# Patient Record
Sex: MALE | Race: WHITE | NOT HISPANIC OR LATINO | Employment: OTHER | URBAN - NONMETROPOLITAN AREA
[De-identification: names, ages, dates, MRNs, and addresses within clinical notes are randomized per-mention and may not be internally consistent; named-entity substitution may affect disease eponyms.]

---

## 2023-04-26 ENCOUNTER — OFFICE VISIT (OUTPATIENT)
Dept: PRIMARY CARE | Facility: CLINIC | Age: 73
End: 2023-04-26
Payer: MEDICARE

## 2023-04-26 VITALS
SYSTOLIC BLOOD PRESSURE: 115 MMHG | BODY MASS INDEX: 19.93 KG/M2 | DIASTOLIC BLOOD PRESSURE: 73 MMHG | HEART RATE: 98 BPM | OXYGEN SATURATION: 97 % | WEIGHT: 139.2 LBS | HEIGHT: 70 IN

## 2023-04-26 DIAGNOSIS — I27.20 MILD PULMONARY HYPERTENSION (MULTI): ICD-10-CM

## 2023-04-26 DIAGNOSIS — E46 PROTEIN-CALORIE MALNUTRITION, UNSPECIFIED SEVERITY (MULTI): ICD-10-CM

## 2023-04-26 DIAGNOSIS — J44.9 CHRONIC OBSTRUCTIVE PULMONARY DISEASE, UNSPECIFIED COPD TYPE (MULTI): ICD-10-CM

## 2023-04-26 DIAGNOSIS — F33.9 DEPRESSION, RECURRENT (CMS-HCC): ICD-10-CM

## 2023-04-26 DIAGNOSIS — R56.9 SEIZURE (MULTI): ICD-10-CM

## 2023-04-26 DIAGNOSIS — H61.21 IMPACTED CERUMEN OF RIGHT EAR: Primary | ICD-10-CM

## 2023-04-26 DIAGNOSIS — F10.20 ALCOHOLISM (MULTI): ICD-10-CM

## 2023-04-26 DIAGNOSIS — F41.8 DEPRESSION WITH ANXIETY: ICD-10-CM

## 2023-04-26 DIAGNOSIS — F10.10 ALCOHOL ABUSE: ICD-10-CM

## 2023-04-26 PROBLEM — F17.200 SMOKING: Status: ACTIVE | Noted: 2023-04-26

## 2023-04-26 PROBLEM — K21.9 GASTROESOPHAGEAL REFLUX: Status: ACTIVE | Noted: 2023-04-26

## 2023-04-26 PROBLEM — I34.0 MILD MITRAL REGURGITATION: Status: ACTIVE | Noted: 2023-04-26

## 2023-04-26 PROBLEM — E55.9 AVITAMINOSIS D: Status: ACTIVE | Noted: 2023-04-26

## 2023-04-26 PROBLEM — R45.86 MOOD SWINGS: Status: ACTIVE | Noted: 2023-04-26

## 2023-04-26 PROBLEM — M54.12 CERVICAL RADICULOPATHY: Status: ACTIVE | Noted: 2023-04-26

## 2023-04-26 PROBLEM — R53.1 WEAKNESS GENERALIZED: Status: ACTIVE | Noted: 2023-04-26

## 2023-04-26 PROBLEM — S22.000A THORACIC COMPRESSION FRACTURE (MULTI): Status: ACTIVE | Noted: 2023-04-26

## 2023-04-26 PROBLEM — R07.89 MUSCULOSKELETAL CHEST PAIN: Status: ACTIVE | Noted: 2023-04-26

## 2023-04-26 PROBLEM — G40.109: Status: ACTIVE | Noted: 2023-04-26

## 2023-04-26 PROBLEM — Z86.73 HISTORY OF STROKE: Status: ACTIVE | Noted: 2023-04-26

## 2023-04-26 PROBLEM — G40.909 SEIZURE, EPILEPTIC (MULTI): Status: ACTIVE | Noted: 2023-04-26

## 2023-04-26 PROBLEM — F12.90 MARIJUANA USE, CONTINUOUS: Status: ACTIVE | Noted: 2023-04-26

## 2023-04-26 PROBLEM — H91.93 HEARING LOSS, BILATERAL: Status: ACTIVE | Noted: 2023-04-26

## 2023-04-26 PROBLEM — M72.0 DUPUYTREN CONTRACTURE: Status: ACTIVE | Noted: 2023-04-26

## 2023-04-26 PROBLEM — R40.4 SPELL OF ALTERED CONSCIOUSNESS: Status: ACTIVE | Noted: 2023-04-26

## 2023-04-26 PROBLEM — E78.5 DYSLIPIDEMIA: Status: ACTIVE | Noted: 2023-04-26

## 2023-04-26 PROBLEM — R20.0 NUMBNESS AND TINGLING IN BOTH HANDS: Status: ACTIVE | Noted: 2023-04-26

## 2023-04-26 PROBLEM — N39.0 RECURRENT UTI (URINARY TRACT INFECTION): Status: RESOLVED | Noted: 2023-04-26 | Resolved: 2023-04-26

## 2023-04-26 PROBLEM — M79.18 MYOFASCIAL PAIN SYNDROME: Status: ACTIVE | Noted: 2023-04-26

## 2023-04-26 PROBLEM — M47.812 CERVICAL SPONDYLOSIS WITHOUT MYELOPATHY: Status: ACTIVE | Noted: 2023-04-26

## 2023-04-26 PROBLEM — I65.23 BILATERAL CAROTID ARTERY STENOSIS: Status: ACTIVE | Noted: 2023-04-26

## 2023-04-26 PROBLEM — Z90.49 HISTORY OF COLON RESECTION: Status: ACTIVE | Noted: 2023-04-26

## 2023-04-26 PROBLEM — E78.5 HYPERLIPEMIA: Status: ACTIVE | Noted: 2023-04-26

## 2023-04-26 PROBLEM — L30.9 DERMATITIS: Status: RESOLVED | Noted: 2023-04-26 | Resolved: 2023-04-26

## 2023-04-26 PROBLEM — F41.9 ANXIETY: Status: ACTIVE | Noted: 2023-04-26

## 2023-04-26 PROBLEM — I65.29 CAROTID ARTERY PLAQUE: Status: ACTIVE | Noted: 2023-04-26

## 2023-04-26 PROBLEM — N40.0 BPH (BENIGN PROSTATIC HYPERPLASIA): Status: ACTIVE | Noted: 2023-04-26

## 2023-04-26 PROBLEM — M54.9 BACK PAIN: Status: ACTIVE | Noted: 2023-04-26

## 2023-04-26 PROBLEM — R73.9 HYPERGLYCEMIA: Status: ACTIVE | Noted: 2023-04-26

## 2023-04-26 PROBLEM — I34.1 MITRAL VALVE PROLAPSE: Status: ACTIVE | Noted: 2023-04-26

## 2023-04-26 PROBLEM — M54.14 THORACIC RADICULITIS: Status: ACTIVE | Noted: 2023-04-26

## 2023-04-26 PROBLEM — R20.2 NUMBNESS AND TINGLING IN BOTH HANDS: Status: ACTIVE | Noted: 2023-04-26

## 2023-04-26 PROBLEM — R03.0 ELEVATED BLOOD PRESSURE READING: Status: ACTIVE | Noted: 2023-04-26

## 2023-04-26 PROBLEM — H90.3 BILATERAL SENSORINEURAL HEARING LOSS: Status: ACTIVE | Noted: 2023-04-26

## 2023-04-26 PROBLEM — R26.89 IMBALANCE: Status: ACTIVE | Noted: 2023-04-26

## 2023-04-26 PROBLEM — G31.84 MCI (MILD COGNITIVE IMPAIRMENT): Status: ACTIVE | Noted: 2023-04-26

## 2023-04-26 PROCEDURE — 99213 OFFICE O/P EST LOW 20 MIN: CPT | Performed by: INTERNAL MEDICINE

## 2023-04-26 PROCEDURE — 69209 REMOVE IMPACTED EAR WAX UNI: CPT | Performed by: INTERNAL MEDICINE

## 2023-04-26 RX ORDER — LEVETIRACETAM 1000 MG/1
1000 TABLET ORAL 2 TIMES DAILY
Qty: 180 TABLET | Refills: 0 | Status: CANCELLED | OUTPATIENT
Start: 2023-04-26 | End: 2023-07-25

## 2023-04-26 ASSESSMENT — PATIENT HEALTH QUESTIONNAIRE - PHQ9
2. FEELING DOWN, DEPRESSED OR HOPELESS: NEARLY EVERY DAY
5. POOR APPETITE OR OVEREATING: MORE THAN HALF THE DAYS
9. THOUGHTS THAT YOU WOULD BE BETTER OFF DEAD, OR OF HURTING YOURSELF: NEARLY EVERY DAY
SUM OF ALL RESPONSES TO PHQ9 QUESTIONS 1 AND 2: 6
8. MOVING OR SPEAKING SO SLOWLY THAT OTHER PEOPLE COULD HAVE NOTICED. OR THE OPPOSITE, BEING SO FIGETY OR RESTLESS THAT YOU HAVE BEEN MOVING AROUND A LOT MORE THAN USUAL: NEARLY EVERY DAY
4. FEELING TIRED OR HAVING LITTLE ENERGY: NEARLY EVERY DAY
SUM OF ALL RESPONSES TO PHQ QUESTIONS 1-9: 26
10. IF YOU CHECKED OFF ANY PROBLEMS, HOW DIFFICULT HAVE THESE PROBLEMS MADE IT FOR YOU TO DO YOUR WORK, TAKE CARE OF THINGS AT HOME, OR GET ALONG WITH OTHER PEOPLE: EXTREMELY DIFFICULT
6. FEELING BAD ABOUT YOURSELF - OR THAT YOU ARE A FAILURE OR HAVE LET YOURSELF OR YOUR FAMILY DOWN: NEARLY EVERY DAY
1. LITTLE INTEREST OR PLEASURE IN DOING THINGS: NEARLY EVERY DAY
7. TROUBLE CONCENTRATING ON THINGS, SUCH AS READING THE NEWSPAPER OR WATCHING TELEVISION: NEARLY EVERY DAY
3. TROUBLE FALLING OR STAYING ASLEEP OR SLEEPING TOO MUCH: NEARLY EVERY DAY

## 2023-04-26 NOTE — PROGRESS NOTES
"Subjective   Patient ID: Nicola Jackson is a 72 y.o. male who presents for Seizures (Thinks he is having seizures, wakes up on floor with no idea what happened.).    HPI     Review of Systems    Objective   /73   Pulse 98   Ht 1.778 m (5' 10\")   Wt 63.1 kg (139 lb 3.2 oz)   SpO2 97%   BMI 19.97 kg/m²     Physical Exam    Assessment/Plan        Patient ID: Nicola Jackson is a 72 y.o. male.    Ear Cerumen Removal    Date/Time: 4/26/2023 3:08 PM    Performed by: Yoli Waller LPN  Authorized by: Meredith Solrozano MD    Consent:     Consent obtained:  Verbal    Consent given by:  Patient    Risks, benefits, and alternatives were discussed: yes    Universal protocol:     Patient identity confirmed:  Verbally with patient  Procedure details:     Location:  R ear    Procedure type: irrigation      Procedure outcomes: cerumen removed    Post-procedure details:     Hearing quality:  Improved    Procedure completion:  Tolerated    "

## 2023-04-26 NOTE — PROGRESS NOTES
"Subjective  Pt. States that he has had two seizures in the past two weeks, felt he had one last night. He states he has not taken any medication for seizures in the past two years. He states that he had not had seizures for past 8-9 months. He states that he has been drinking 8-10 beers per night. He states he has been moving his bowels ok. He states he has been urinating ok, but it is concentrated. He states he lives alone. He states he is very depressed as his wife recently  and he has no one. He states that his friends are helping him, was found on couch having seizure recently and they called to make him appointment. He states he woke up this morning soaked with urine \"clear up to his chest\".    ROBERT Jackson is a 72 y.o. male with PMH remarkable for left temporal lobe epilepsy who presents today to request neurology referrral.     Review of Systems  Constitutional: No fever, chills, anorexia or weight loss  Eye: No blurry vision, diplopia or vision loss  ENT: No nasal congestion, earache or sore throat  Respiratory: No cough, SOB, hemoptysis or wheezing  Cardiac: No chest pain or palpitations, dyspnea on exertion or orthopnea  Gastrointestinal: No abdominal pain, nausea, vomiting, diarrhea, melena, hemoptysis or hematochezia.  Genitourinary: No dysuria, hematuria, frequency, urgency, incontinence or flank pain  Musculoskeletal: No arthralgia, myalgia, stiffness, weakness  Neurological: No dizziness, lightheadness, blurry vision, headache, + syncope  Psychiatric: + history of anxiety, depression, No hallucinations, suicidal/homicidal ideation  Skin: No rash or pruritus, ulcers  Endocrine: No heat or cold intolerance, polyuria, polydipsia  Hematologic: No bruising, petechiae    Objective   Vitals:    23 1428   BP: 115/73   Pulse: 98   SpO2: 97%   Weight: 63.1 kg (139 lb 3.2 oz)   Height: 1.778 m (5' 10\")     Physical Exam  General: Alert and oriented x3, underweight, very anxious and argumentative, " "extremely Teller  Eye: PERRL, EOMI, normal conjunctiva.  HENT: Normocephalic, clear tympanic membranes, moist oral mucosa, no sinus tenderness.  Neck: Supple, non-tender, no carotid bruits, no JVD, no lymphadenopathy.  Lungs: diffuse rhonchi, non-labored respiration.  Heart: Normal rate, regular rhythm, no murmur, gallop or edema.  Abdomen: Soft, non-tender, non-distended, normal bowel sounds, no masses.  Musculoskeletal: Normal range of motion and strength, no tenderness or swelling.  Skin: Skin is warm, dry and pink, no rashes or lesions.  Neurologic: Alert & Oriented x3, intact senses, motor, response and reflexes ok, normal strength, CN II-XII intact.  Psychiatric: Anxious, argumentative    Assessment/Plan   Nicola Jackson is a 72 y.o. male with PMH remarkable for left temporal lobe epilepsy   - reviewed neurology note from  from April 2022, in which it is stated that pt should not be driving until he follows up with epilepsy specialist and pt. Refuses medication for seizures.  - he states he has had no seizures for past 8-9 months, but he has had 2 seizures over past two weeks  - discussed options with patient, recommended hospitalization but patient is unwilling to be hospitalized  - he states he has too many valuables at this time to leave behind to be hospitalized  - he states he has no one to help him  - he is very argumentative, wants to put things off for now  - he is severely depressed since his wife passed away  - pt. With significant anger on exam, he is determined and adamant that he does not want to be hospitalized, and he wants to continue to drive. He states he does not care if he or someone else dies if he has a seizure while driving.   - discussed with patient with 2 nurses present, tried to explain to him his current issues including severe depression, recent seizure, excessive alcohol intake and he started to threaten, stating \"if you take my 's license away, you will see me again\" " I continued to try to talk to patient and explain to him that he needs help and I am willing to help if he will go to hospital and refrain from driving. We will start on Keppra and refer him to neurology. Called to discuss with patient's friend Jenn who's number he provided and who sent patient to us, she did not answer her phone, left long message for her.  - pt. Has possible new seizure activity, patient is very depressed, is alcohol and has COPD at least. Unfortunately, he is not cooperating. Sent in 30 day supply of Keppra- he is agreeable to take. I can watch him for next 30 days, with any follow up or recommendation in that [place] [place] [facility], but will terminate relation with patient after 30 days due to above events and noncompliance.  ------------------------  Written by Chelsey Angelo acting as a scribe for Dr. Butterfield. This note accurately reflects the work and decisions made by Dr. Butterfield.     I, Dr. Butterfield, attest all medical record entries made by the scribe were under my direction and were personally dictated by me. I have reviewed the chart and agree that the record accurately reflects my performance of the history, physical exam, and assessment and plan.

## 2023-04-27 RX ORDER — LEVETIRACETAM 1000 MG/1
1000 TABLET ORAL 2 TIMES DAILY
Qty: 60 TABLET | Refills: 0 | Status: SHIPPED | OUTPATIENT
Start: 2023-04-27 | End: 2024-02-16 | Stop reason: WASHOUT

## 2023-04-28 PROBLEM — E46 PROTEIN-CALORIE MALNUTRITION, UNSPECIFIED SEVERITY (MULTI): Status: ACTIVE | Noted: 2023-04-28

## 2023-04-28 PROBLEM — F33.9 DEPRESSION, RECURRENT (CMS-HCC): Status: ACTIVE | Noted: 2023-04-28

## 2023-04-28 NOTE — PATIENT INSTRUCTIONS

## 2023-11-25 ENCOUNTER — APPOINTMENT (OUTPATIENT)
Dept: RADIOLOGY | Facility: HOSPITAL | Age: 73
End: 2023-11-25
Payer: MEDICARE

## 2023-11-25 ENCOUNTER — HOSPITAL ENCOUNTER (EMERGENCY)
Facility: HOSPITAL | Age: 73
Discharge: HOME | End: 2023-11-25
Attending: STUDENT IN AN ORGANIZED HEALTH CARE EDUCATION/TRAINING PROGRAM
Payer: MEDICARE

## 2023-11-25 VITALS
OXYGEN SATURATION: 99 % | HEART RATE: 87 BPM | HEIGHT: 71 IN | DIASTOLIC BLOOD PRESSURE: 78 MMHG | SYSTOLIC BLOOD PRESSURE: 115 MMHG | RESPIRATION RATE: 15 BRPM | WEIGHT: 136.24 LBS | BODY MASS INDEX: 19.07 KG/M2 | TEMPERATURE: 98.2 F

## 2023-11-25 DIAGNOSIS — R07.81 RIB PAIN: ICD-10-CM

## 2023-11-25 DIAGNOSIS — R26.81 UNSTEADY GAIT: ICD-10-CM

## 2023-11-25 DIAGNOSIS — S09.90XA INJURY OF HEAD, INITIAL ENCOUNTER: ICD-10-CM
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
## 2023-11-25 LAB
ALBUMIN SERPL-MCNC: 5 G/DL (ref 3.5–5)
ALP BLD-CCNC: 154 U/L (ref 35–125)
ALT SERPL-CCNC: 19 U/L (ref 5–40)
ANION GAP SERPL CALC-SCNC: 10 MMOL/L
AST SERPL-CCNC: 26 U/L (ref 5–40)
BASOPHILS # BLD AUTO: 0.04 X10*3/UL (ref 0–0.1)
BASOPHILS NFR BLD AUTO: 0.6 %
BILIRUB SERPL-MCNC: 0.7 MG/DL (ref 0.1–1.2)
BUN SERPL-MCNC: 9 MG/DL (ref 8–25)
CALCIUM SERPL-MCNC: 10.1 MG/DL (ref 8.5–10.4)
CHLORIDE SERPL-SCNC: 99 MMOL/L (ref 97–107)
CO2 SERPL-SCNC: 25 MMOL/L (ref 24–31)
CREAT SERPL-MCNC: 0.8 MG/DL (ref 0.4–1.6)
EOSINOPHIL # BLD AUTO: 0.18 X10*3/UL (ref 0–0.4)
EOSINOPHIL NFR BLD AUTO: 2.7 %
ERYTHROCYTE [DISTWIDTH] IN BLOOD BY AUTOMATED COUNT: 14.9 % (ref 11.5–14.5)
ETHANOL SERPL-MCNC: <0.01 G/DL
GFR SERPL CREATININE-BSD FRML MDRD: >90 ML/MIN/1.73M*2
GLUCOSE SERPL-MCNC: 111 MG/DL (ref 65–99)
HCT VFR BLD AUTO: 46.1 % (ref 41–52)
HGB BLD-MCNC: 15.8 G/DL (ref 13.5–17.5)
IMM GRANULOCYTES # BLD AUTO: 0.02 X10*3/UL (ref 0–0.5)
IMM GRANULOCYTES NFR BLD AUTO: 0.3 % (ref 0–0.9)
LYMPHOCYTES # BLD AUTO: 1.59 X10*3/UL (ref 0.8–3)
LYMPHOCYTES NFR BLD AUTO: 24.3 %
MAGNESIUM SERPL-MCNC: 2 MG/DL (ref 1.6–3.1)
MCH RBC QN AUTO: 32.4 PG (ref 26–34)
MCHC RBC AUTO-ENTMCNC: 34.3 G/DL (ref 32–36)
MCV RBC AUTO: 95 FL (ref 80–100)
MONOCYTES # BLD AUTO: 0.33 X10*3/UL (ref 0.05–0.8)
MONOCYTES NFR BLD AUTO: 5 %
NEUTROPHILS # BLD AUTO: 4.39 X10*3/UL (ref 1.6–5.5)
NEUTROPHILS NFR BLD AUTO: 67.1 %
NRBC BLD-RTO: 0 /100 WBCS (ref 0–0)
PLATELET # BLD AUTO: 226 X10*3/UL (ref 150–450)
POTASSIUM SERPL-SCNC: 4.3 MMOL/L (ref 3.4–5.1)
PROT SERPL-MCNC: 7.7 G/DL (ref 5.9–7.9)
RBC # BLD AUTO: 4.87 X10*6/UL (ref 4.5–5.9)
SODIUM SERPL-SCNC: 134 MMOL/L (ref 133–145)
TROPONIN T SERPL-MCNC: 11 NG/L
TROPONIN T SERPL-MCNC: 12 NG/L
WBC # BLD AUTO: 6.6 X10*3/UL (ref 4.4–11.3)

## 2023-11-25 PROCEDURE — 84484 ASSAY OF TROPONIN QUANT: CPT | Performed by: STUDENT IN AN ORGANIZED HEALTH CARE EDUCATION/TRAINING PROGRAM

## 2023-11-25 PROCEDURE — 99284 EMERGENCY DEPT VISIT MOD MDM: CPT | Performed by: STUDENT IN AN ORGANIZED HEALTH CARE EDUCATION/TRAINING PROGRAM

## 2023-11-25 PROCEDURE — 71046 X-RAY EXAM CHEST 2 VIEWS: CPT | Mod: FY

## 2023-11-25 PROCEDURE — 36415 COLL VENOUS BLD VENIPUNCTURE: CPT | Performed by: PHYSICIAN ASSISTANT

## 2023-11-25 PROCEDURE — 94760 N-INVAS EAR/PLS OXIMETRY 1: CPT

## 2023-11-25 PROCEDURE — 2500000004 HC RX 250 GENERAL PHARMACY W/ HCPCS (ALT 636 FOR OP/ED): Performed by: PHYSICIAN ASSISTANT

## 2023-11-25 PROCEDURE — 85025 COMPLETE CBC W/AUTO DIFF WBC: CPT | Performed by: STUDENT IN AN ORGANIZED HEALTH CARE EDUCATION/TRAINING PROGRAM

## 2023-11-25 PROCEDURE — 36415 COLL VENOUS BLD VENIPUNCTURE: CPT | Performed by: STUDENT IN AN ORGANIZED HEALTH CARE EDUCATION/TRAINING PROGRAM

## 2023-11-25 PROCEDURE — 82077 ASSAY SPEC XCP UR&BREATH IA: CPT | Performed by: PHYSICIAN ASSISTANT

## 2023-11-25 PROCEDURE — 70450 CT HEAD/BRAIN W/O DYE: CPT

## 2023-11-25 PROCEDURE — 99285 EMERGENCY DEPT VISIT HI MDM: CPT | Mod: 25

## 2023-11-25 PROCEDURE — 80053 COMPREHEN METABOLIC PANEL: CPT | Performed by: STUDENT IN AN ORGANIZED HEALTH CARE EDUCATION/TRAINING PROGRAM

## 2023-11-25 PROCEDURE — 83735 ASSAY OF MAGNESIUM: CPT | Performed by: PHYSICIAN ASSISTANT

## 2023-11-25 RX ORDER — ACETAMINOPHEN 325 MG/1
975 TABLET ORAL ONCE
Status: COMPLETED | OUTPATIENT
Start: 2023-11-25 | End: 2023-11-25

## 2023-11-25 RX ADMIN — ACETAMINOPHEN 975 MG: 325 TABLET ORAL at 11:38

## 2023-11-25 RX ADMIN — SODIUM CHLORIDE 1000 ML: 900 INJECTION, SOLUTION INTRAVENOUS at 11:38

## 2023-11-25 ASSESSMENT — PAIN DESCRIPTION - ORIENTATION: ORIENTATION: RIGHT

## 2023-11-25 ASSESSMENT — PAIN SCALES - GENERAL
PAINLEVEL_OUTOF10: 0 - NO PAIN
PAINLEVEL_OUTOF10: 10 - WORST POSSIBLE PAIN

## 2023-11-25 ASSESSMENT — PAIN - FUNCTIONAL ASSESSMENT
PAIN_FUNCTIONAL_ASSESSMENT: 0-10
PAIN_FUNCTIONAL_ASSESSMENT: 0-10

## 2023-11-25 ASSESSMENT — COLUMBIA-SUICIDE SEVERITY RATING SCALE - C-SSRS
2. HAVE YOU ACTUALLY HAD ANY THOUGHTS OF KILLING YOURSELF?: NO
1. IN THE PAST MONTH, HAVE YOU WISHED YOU WERE DEAD OR WISHED YOU COULD GO TO SLEEP AND NOT WAKE UP?: NO
6. HAVE YOU EVER DONE ANYTHING, STARTED TO DO ANYTHING, OR PREPARED TO DO ANYTHING TO END YOUR LIFE?: NO

## 2023-11-25 ASSESSMENT — PAIN DESCRIPTION - PAIN TYPE: TYPE: ACUTE PAIN

## 2023-11-25 ASSESSMENT — PAIN DESCRIPTION - ONSET: ONSET: SUDDEN

## 2023-11-25 ASSESSMENT — PAIN DESCRIPTION - PROGRESSION: CLINICAL_PROGRESSION: NOT CHANGED

## 2023-11-25 ASSESSMENT — PAIN DESCRIPTION - DESCRIPTORS: DESCRIPTORS: DULL;PRESSURE

## 2023-11-25 NOTE — ED PROVIDER NOTES
HPI   Chief Complaint   Patient presents with    Gait Problem     For the past 3 days I have been unsteady on my feet and fell and hit my rt face I have pain in my rt flank and whole body the pain is dull        73-year-old male presented emergency department with a chief complaint of fall that occurred 3 days ago.  States that he has baseline unsteady gait.  He also drinks alcohol almost daily.  He struck his head at that time.  Additionally injured his right rib.  He denies any chest pain or shortness of breath.  He is well-appearing nontoxic afebrile.  He did not drink alcohol today.  He denies abdominal pain or dysuria.  No other complaint.                          No data recorded                Patient History   Past Medical History:   Diagnosis Date    Alcohol abuse, uncomplicated 09/22/2015    Alcohol abuse    Alcohol dependence, in remission (CMS/[facility], located within [facility]) 02/25/2019    History of alcoholism    Cellulitis of right upper limb 04/16/2015    Cellulitis of arm, right    Dermatitis 04/26/2023    Effusion, unspecified elbow 06/01/2015    Elbow swelling    Localization-related (focal) (partial) symptomatic epilepsy and epileptic syndromes with simple partial seizures, not intractable, without status epilepticus (CMS/[facility], located within [facility]) 08/24/2022    Temporal lobe seizure    Other amnesia 09/22/2016    Poor memory    Other specified disorders of teeth and supporting structures 10/17/2016    Tooth pain    Other specified health status 11/09/2015    Statin intolerance    Other specified soft tissue disorders 04/19/2016    Finger swelling    Pain in unspecified finger(s) 04/06/2017    Finger pain    Pain in unspecified shoulder 04/06/2017    Shoulder pain    Personal history of other diseases of the digestive system 06/01/2015    History of gastritis    Personal history of other diseases of the musculoskeletal system and connective tissue 09/24/2019    History of neck pain    Personal history of other diseases of urinary system  05/21/2019    History of hematuria    Personal history of other malignant neoplasm of large intestine     History of malignant neoplasm of colon    Personal history of other specified conditions 06/18/2018    History of confusion    Personal history of other specified conditions 03/14/2019    History of chest pain    Personal history of other specified conditions 06/06/2019    History of abdominal pain    Recurrent UTI (urinary tract infection) 04/26/2023    Unspecified abdominal pain 02/02/2015    Abdominal pain of multiple sites    Unspecified cataract     Cataract, bilateral     Past Surgical History:   Procedure Laterality Date    CATARACT EXTRACTION  11/09/2015    Cataract Surgery    COLON SURGERY  08/22/2018    Colon Surgery    CT ABDOMEN ANGIOGRAM W AND/OR WO IV CONTRAST  7/4/2019    CT ABDOMEN ANGIOGRAM W AND/OR WO IV CONTRAST 7/4/2019 [facility]    MR HEAD ANGIO WO IV CONTRAST  9/17/2015    MR HEAD ANGIO WO IV CONTRAST 9/17/2015 [facility]    MR NECK ANGIO WO IV CONTRAST  9/17/2015    MR NECK ANGIO WO IV CONTRAST 9/17/2015 [facility]    OTHER SURGICAL HISTORY  08/04/2021    Finger surgical procedure     No family history on file.  Social History     Tobacco Use    Smoking status: Every Day     Packs/day: 1     Types: Cigarettes    Smokeless tobacco: Never   Substance Use Topics    Alcohol use: Yes     Alcohol/week: 70.0 standard drinks of alcohol     Types: 70 Cans of beer per week    Drug use: Yes     Types: Marijuana     Comment: smokes 1/2 bowl nightly       Physical Exam   ED Triage Vitals [11/25/23 1104]   Temp Heart Rate Resp BP   36.5 °C (97.7 °F) 95 18 (!) 117/96      SpO2 Temp Source Heart Rate Source Patient Position   98 % Oral Monitor Sitting      BP Location FiO2 (%)     Left arm --       Physical Exam  Vitals and nursing note reviewed.   Constitutional:       Appearance: Normal appearance.   HENT:      Head: Normocephalic and atraumatic.      Nose: Nose normal.       Mouth/Throat:      Mouth: Mucous membranes are moist.   Cardiovascular:      Rate and Rhythm: Normal rate and regular rhythm.   Pulmonary:      Effort: Pulmonary effort is normal.      Breath sounds: Normal breath sounds.   Abdominal:      General: Abdomen is flat.      Palpations: Abdomen is soft.   Musculoskeletal:         General: No swelling or tenderness. Normal range of motion.      Cervical back: Normal range of motion and neck supple.      Comments: Moving all extremities, no midline spinal tenderness   Skin:     General: Skin is warm and dry.   Neurological:      General: No focal deficit present.      Mental Status: He is alert and oriented to person, place, and time.   Psychiatric:         Mood and Affect: Mood normal.         ED Course & MDM   ED Course as of 11/25/23 2242   Sat Nov 25, 2023   1233 EKG: Normal sinus rhythm with rate of 88 beats minute.  Normal axis.  QTc 440 ms, parable 128.  No ST elevation or depression, no acute ischemic pattern.  No STEMI.  Motion and affect noted.  Otherwise normal EKG. [NT]      ED Course User Index  [NT] Jb Levy DO         Diagnoses as of 11/25/23 2242   Fall, initial encounter   Injury of head, initial encounter   Rib pain   Unsteady gait       Medical Decision Making  I have seen and evaluated this patient.  The attending physician has also seen and evaluated this patient.  Vital signs, laboratory testing and diagnostic images if applicable have been reviewed.  All laboratory and imaging is interpreted by myself unless otherwise stated.  Radiology studies are also formally interpreted by radiologist.    CBC without significant leukocytosis or anemia, metabolic panel without significant renal impairment or electrolyte abnormality.  Negative CT brain, negative chest x-ray.  Patient ambulatory in emergency department.  No occasion for admission.  Patient and family feel comfortable with plan of discharge.  Released in good condition to follow-up with  outpatient physician.    Labs Reviewed  CBC WITH AUTO DIFFERENTIAL - Abnormal     WBC                           6.6                    nRBC                          0.0                    RBC                           4.87                   Hemoglobin                    15.8                   Hematocrit                    46.1                   MCV                           95                     MCH                           32.4                   MCHC                          34.3                   RDW                           14.9 (*)               Platelets                     226                    Neutrophils %                 67.1                   Immature Granulocytes %, Automated   0.3                    Lymphocytes %                 24.3                   Monocytes %                   5.0                    Eosinophils %                 2.7                    Basophils %                   0.6                    Neutrophils Absolute          4.39                   Immature Granulocytes Absolute, Au*   0.02                   Lymphocytes Absolute          1.59                   Monocytes Absolute            0.33                   Eosinophils Absolute          0.18                   Basophils Absolute            0.04                COMPREHENSIVE METABOLIC PANEL - Abnormal     Glucose                       111 (*)                Sodium                        134                    Potassium                     4.3                    Chloride                      99                     Bicarbonate                   25                     Urea Nitrogen                 9                      Creatinine                    0.80                   eGFR                          >90                    Calcium                       10.1                   Albumin                       5.0                    Alkaline Phosphatase          154 (*)                Total Protein                 7.7                    AST                            26                     Bilirubin, Total              0.7                    ALT                           19                     Anion Gap                     10                  SERIAL TROPONIN, INITIAL ([facility]) - Normal     Troponin T, High Sensitivity   12                  SERIAL TROPONIN,  2 HOUR ([facility]) - Normal     Troponin T, High Sensitivity   11                  ALCOHOL - Normal     Alcohol                       <0.010              MAGNESIUM - Normal     Magnesium                     2.00                TROPONIN T SERIES, HIGH SENSITIVITY (0, 2 HR, 6 HR)       Narrative: The following orders were created for panel order Troponin T Series, High Sensitivity (0, 2HR, 6HR).                Procedure                               Abnormality         Status                                   ---------                               -----------         ------                                   Serial Troponin, Initial...[[id number]]  Normal              Final result                             Serial Troponin, 2 Hour ...[[id number]]  Normal              Final result                             Serial Troponin, 6 Hour ...[[id number]]                                                                               Please view results for these tests on the individual orders.  SERIAL TROPONIN, 6 HOUR ([facility])  XR chest 2 views   Final Result    No acute cardiopulmonary process.                      Signed by: Heena Quintanilla 11/25/2023 12:50 PM    Dictation workstation:   [id number]     CT head wo IV contrast   Final Result    No acute intracranial abnormality.          MACRO:    None.          Signed by: Heena Quintanilla 11/25/2023 12:10 PM    Dictation workstation:   [id number]     Medications  sodium chloride 0.9 % bolus 1,000 mL (0 mL intravenous Stopped 11/25/23 1215)  acetaminophen (Tylenol) tablet 975 mg (975 mg oral Given 11/25/23 1138)  Discharge Medication List as of 11/25/2023  1:27  PM                Procedure  Procedures     Jonnathan Stevens PA-C  11/26/23 1535

## 2023-11-30 ENCOUNTER — HOSPITAL ENCOUNTER (OUTPATIENT)
Dept: CARDIOLOGY | Facility: HOSPITAL | Age: 73
Discharge: HOME | End: 2023-11-30
Payer: MEDICARE

## 2023-11-30 LAB
ATRIAL RATE: 88 BPM
P AXIS: 69 DEGREES
P OFFSET: 204 MS
P ONSET: 158 MS
PR INTERVAL: 128 MS
Q ONSET: 222 MS
QRS COUNT: 15 BEATS
QRS DURATION: 82 MS
QT INTERVAL: 364 MS
QTC CALCULATION(BAZETT): 440 MS
QTC FREDERICIA: 413 MS
R AXIS: 33 DEGREES
T AXIS: 27 DEGREES
T OFFSET: 404 MS
VENTRICULAR RATE: 88 BPM

## 2023-11-30 PROCEDURE — 93005 ELECTROCARDIOGRAM TRACING: CPT

## 2023-12-29 ENCOUNTER — OFFICE VISIT (OUTPATIENT)
Dept: PRIMARY CARE | Facility: CLINIC | Age: 73
End: 2023-12-29
Payer: MEDICARE

## 2023-12-29 ENCOUNTER — APPOINTMENT (OUTPATIENT)
Dept: PRIMARY CARE | Facility: CLINIC | Age: 73
End: 2023-12-29
Payer: MEDICARE

## 2023-12-29 VITALS
OXYGEN SATURATION: 96 % | DIASTOLIC BLOOD PRESSURE: 76 MMHG | SYSTOLIC BLOOD PRESSURE: 130 MMHG | HEIGHT: 69 IN | HEART RATE: 80 BPM | BODY MASS INDEX: 20.88 KG/M2 | WEIGHT: 141 LBS

## 2023-12-29 DIAGNOSIS — G40.909 NONINTRACTABLE EPILEPSY WITHOUT STATUS EPILEPTICUS, UNSPECIFIED EPILEPSY TYPE (MULTI): ICD-10-CM

## 2023-12-29 DIAGNOSIS — Z00.00 ANNUAL PHYSICAL EXAM: Primary | ICD-10-CM

## 2023-12-29 PROCEDURE — 4004F PT TOBACCO SCREEN RCVD TLK: CPT

## 2023-12-29 PROCEDURE — 1159F MED LIST DOCD IN RCRD: CPT

## 2023-12-29 PROCEDURE — G0439 PPPS, SUBSEQ VISIT: HCPCS

## 2023-12-29 PROCEDURE — 1126F AMNT PAIN NOTED NONE PRSNT: CPT

## 2023-12-29 ASSESSMENT — PATIENT HEALTH QUESTIONNAIRE - PHQ9
1. LITTLE INTEREST OR PLEASURE IN DOING THINGS: NOT AT ALL
2. FEELING DOWN, DEPRESSED OR HOPELESS: NOT AT ALL
SUM OF ALL RESPONSES TO PHQ9 QUESTIONS 1 AND 2: 0

## 2023-12-29 ASSESSMENT — PAIN SCALES - GENERAL: PAINLEVEL: 0-NO PAIN
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
## 2024-01-05 ENCOUNTER — LAB (OUTPATIENT)
Dept: LAB | Facility: LAB | Age: 74
End: 2024-01-05
Payer: MEDICARE

## 2024-01-05 DIAGNOSIS — E41 NUTRITIONAL MARASMUS (MULTI): ICD-10-CM

## 2024-01-05 DIAGNOSIS — G40.909 NONINTRACTABLE EPILEPSY WITHOUT STATUS EPILEPTICUS, UNSPECIFIED EPILEPSY TYPE (MULTI): ICD-10-CM

## 2024-01-05 DIAGNOSIS — Z00.00 ANNUAL PHYSICAL EXAM: ICD-10-CM

## 2024-01-05 LAB
25(OH)D3 SERPL-MCNC: 15 NG/ML (ref 31–100)
ALBUMIN SERPL-MCNC: 4.6 G/DL (ref 3.5–5)
ALP BLD-CCNC: 141 U/L (ref 35–125)
ALT SERPL-CCNC: 18 U/L (ref 5–40)
ANION GAP SERPL CALC-SCNC: 15 MMOL/L
AST SERPL-CCNC: 27 U/L (ref 5–40)
BASOPHILS # BLD AUTO: 0.07 X10*3/UL (ref 0–0.1)
BASOPHILS NFR BLD AUTO: 0.8 %
BILIRUB SERPL-MCNC: 0.6 MG/DL (ref 0.1–1.2)
BUN SERPL-MCNC: 10 MG/DL (ref 8–25)
CALCIUM SERPL-MCNC: 9.9 MG/DL (ref 8.5–10.4)
CHLORIDE SERPL-SCNC: 101 MMOL/L (ref 97–107)
CHOLEST SERPL-MCNC: 251 MG/DL (ref 133–200)
CHOLEST/HDLC SERPL: 3.6 {RATIO}
CO2 SERPL-SCNC: 24 MMOL/L (ref 24–31)
CREAT SERPL-MCNC: 1 MG/DL (ref 0.4–1.6)
EOSINOPHIL # BLD AUTO: 0.3 X10*3/UL (ref 0–0.4)
EOSINOPHIL NFR BLD AUTO: 3.6 %
ERYTHROCYTE [DISTWIDTH] IN BLOOD BY AUTOMATED COUNT: 13.7 % (ref 11.5–14.5)
EST. AVERAGE GLUCOSE BLD GHB EST-MCNC: 97 MG/DL
GFR SERPL CREATININE-BSD FRML MDRD: 80 ML/MIN/1.73M*2
GLUCOSE SERPL-MCNC: 97 MG/DL (ref 65–99)
HBA1C MFR BLD: 5 %
HCT VFR BLD AUTO: 50.2 % (ref 41–52)
HDLC SERPL-MCNC: 70 MG/DL
HGB BLD-MCNC: 16.6 G/DL (ref 13.5–17.5)
IMM GRANULOCYTES # BLD AUTO: 0.02 X10*3/UL (ref 0–0.5)
IMM GRANULOCYTES NFR BLD AUTO: 0.2 % (ref 0–0.9)
LDLC SERPL CALC-MCNC: 160 MG/DL (ref 65–130)
LYMPHOCYTES # BLD AUTO: 1.56 X10*3/UL (ref 0.8–3)
LYMPHOCYTES NFR BLD AUTO: 18.8 %
MCH RBC QN AUTO: 32.3 PG (ref 26–34)
MCHC RBC AUTO-ENTMCNC: 33.1 G/DL (ref 32–36)
MCV RBC AUTO: 98 FL (ref 80–100)
MONOCYTES # BLD AUTO: 0.4 X10*3/UL (ref 0.05–0.8)
MONOCYTES NFR BLD AUTO: 4.8 %
NEUTROPHILS # BLD AUTO: 5.97 X10*3/UL (ref 1.6–5.5)
NEUTROPHILS NFR BLD AUTO: 71.8 %
NRBC BLD-RTO: 0 /100 WBCS (ref 0–0)
PLATELET # BLD AUTO: 202 X10*3/UL (ref 150–450)
POTASSIUM SERPL-SCNC: 5.1 MMOL/L (ref 3.4–5.1)
PROT SERPL-MCNC: 6.8 G/DL (ref 5.9–7.9)
RBC # BLD AUTO: 5.14 X10*6/UL (ref 4.5–5.9)
SODIUM SERPL-SCNC: 140 MMOL/L (ref 133–145)
TRIGL SERPL-MCNC: 106 MG/DL (ref 40–150)
VIT B12 SERPL-MCNC: 286 PG/ML (ref 211–946)
WBC # BLD AUTO: 8.3 X10*3/UL (ref 4.4–11.3)

## 2024-01-05 PROCEDURE — 82306 VITAMIN D 25 HYDROXY: CPT

## 2024-01-05 PROCEDURE — 82607 VITAMIN B-12: CPT

## 2024-01-05 PROCEDURE — 36415 COLL VENOUS BLD VENIPUNCTURE: CPT

## 2024-01-05 PROCEDURE — 80061 LIPID PANEL: CPT

## 2024-01-05 PROCEDURE — 83036 HEMOGLOBIN GLYCOSYLATED A1C: CPT

## 2024-01-05 PROCEDURE — 80053 COMPREHEN METABOLIC PANEL: CPT

## 2024-01-05 PROCEDURE — 85025 COMPLETE CBC W/AUTO DIFF WBC: CPT

## 2024-01-07 ASSESSMENT — ENCOUNTER SYMPTOMS
WHEEZING: 0
JOINT SWELLING: 0
SHORTNESS OF BREATH: 0
SORE THROAT: 0
CONSTIPATION: 0
FEVER: 0
FATIGUE: 0
VOMITING: 0
NAUSEA: 0
TROUBLE SWALLOWING: 0
BLOOD IN STOOL: 0
CONFUSION: 1
RHINORRHEA: 0
DECREASED CONCENTRATION: 1
WEAKNESS: 0
DIARRHEA: 0
ACTIVITY CHANGE: 0
LIGHT-HEADEDNESS: 0
PALPITATIONS: 0
ABDOMINAL PAIN: 0
SLEEP DISTURBANCE: 0
HEADACHES: 0
DIFFICULTY URINATING: 0
UNEXPECTED WEIGHT CHANGE: 0
CHILLS: 0
COUGH: 0
COLOR CHANGE: 0
MYALGIAS: 0
APPETITE CHANGE: 0
DIZZINESS: 0
SEIZURES: 0
ARTHRALGIAS: 0
ENDOCRINE NEGATIVE: 1
CHEST TIGHTNESS: 0

## 2024-01-07 ASSESSMENT — VISUAL ACUITY: OU: 1

## 2024-01-08 ENCOUNTER — APPOINTMENT (OUTPATIENT)
Dept: OTOLARYNGOLOGY | Facility: CLINIC | Age: 74
End: 2024-01-08
Payer: MEDICARE

## 2024-01-08 ENCOUNTER — APPOINTMENT (OUTPATIENT)
Dept: AUDIOLOGY | Facility: CLINIC | Age: 74
End: 2024-01-08
Payer: MEDICARE

## 2024-01-08 ENCOUNTER — DOCUMENTATION (OUTPATIENT)
Dept: PRIMARY CARE | Facility: CLINIC | Age: 74
End: 2024-01-08

## 2024-01-23 ENCOUNTER — OFFICE VISIT (OUTPATIENT)
Dept: OTOLARYNGOLOGY | Facility: CLINIC | Age: 74
End: 2024-01-23
Payer: MEDICARE

## 2024-01-23 ENCOUNTER — CLINICAL SUPPORT (OUTPATIENT)
Dept: AUDIOLOGY | Facility: CLINIC | Age: 74
End: 2024-01-23
Payer: MEDICARE

## 2024-01-23 VITALS — TEMPERATURE: 96.9 F | WEIGHT: 140 LBS | BODY MASS INDEX: 20.73 KG/M2 | HEIGHT: 69 IN

## 2024-01-23 DIAGNOSIS — H90.3 BILATERAL SENSORINEURAL HEARING LOSS: Primary | ICD-10-CM

## 2024-01-23 DIAGNOSIS — H90.3 ASYMMETRICAL SENSORINEURAL HEARING LOSS: ICD-10-CM

## 2024-01-23 DIAGNOSIS — H93.13 TINNITUS OF BOTH EARS: ICD-10-CM

## 2024-01-23 DIAGNOSIS — H90.3 SENSORINEURAL HEARING LOSS (SNHL) OF BOTH EARS: Primary | ICD-10-CM

## 2024-01-23 PROCEDURE — 99213 OFFICE O/P EST LOW 20 MIN: CPT | Performed by: OTOLARYNGOLOGY

## 2024-01-23 PROCEDURE — 1160F RVW MEDS BY RX/DR IN RCRD: CPT | Performed by: OTOLARYNGOLOGY

## 2024-01-23 PROCEDURE — 1159F MED LIST DOCD IN RCRD: CPT | Performed by: OTOLARYNGOLOGY

## 2024-01-23 PROCEDURE — 1126F AMNT PAIN NOTED NONE PRSNT: CPT | Performed by: OTOLARYNGOLOGY

## 2024-01-23 PROCEDURE — 92550 TYMPANOMETRY & REFLEX THRESH: CPT | Performed by: AUDIOLOGIST

## 2024-01-23 PROCEDURE — 92557 COMPREHENSIVE HEARING TEST: CPT | Performed by: AUDIOLOGIST
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
## 2024-02-04 ENCOUNTER — HOSPITAL ENCOUNTER (EMERGENCY)
Facility: HOSPITAL | Age: 74
Discharge: HOME | End: 2024-02-04
Attending: EMERGENCY MEDICINE
Payer: MEDICARE

## 2024-02-04 ENCOUNTER — APPOINTMENT (OUTPATIENT)
Dept: RADIOLOGY | Facility: HOSPITAL | Age: 74
End: 2024-02-04
Payer: MEDICARE

## 2024-02-04 ENCOUNTER — APPOINTMENT (OUTPATIENT)
Dept: CARDIOLOGY | Facility: HOSPITAL | Age: 74
End: 2024-02-04
Payer: MEDICARE

## 2024-02-04 VITALS
SYSTOLIC BLOOD PRESSURE: 153 MMHG | RESPIRATION RATE: 18 BRPM | DIASTOLIC BLOOD PRESSURE: 125 MMHG | OXYGEN SATURATION: 97 % | TEMPERATURE: 98.1 F | WEIGHT: 139.77 LBS | HEART RATE: 100 BPM | BODY MASS INDEX: 20.01 KG/M2 | HEIGHT: 70 IN

## 2024-02-04 DIAGNOSIS — Z91.148 H/O MEDICATION NONCOMPLIANCE: ICD-10-CM

## 2024-02-04 DIAGNOSIS — R56.9 SEIZURE (MULTI): Primary | ICD-10-CM

## 2024-02-04 LAB
ALBUMIN SERPL-MCNC: 4.3 G/DL (ref 3.5–5)
ALP BLD-CCNC: 138 U/L (ref 35–125)
ALT SERPL-CCNC: 19 U/L (ref 5–40)
ANION GAP SERPL CALC-SCNC: 12 MMOL/L
APPEARANCE UR: CLEAR
AST SERPL-CCNC: 31 U/L (ref 5–40)
BILIRUB DIRECT SERPL-MCNC: 0.2 MG/DL (ref 0–0.2)
BILIRUB SERPL-MCNC: 0.6 MG/DL (ref 0.1–1.2)
BILIRUB UR STRIP.AUTO-MCNC: NEGATIVE MG/DL
BUN SERPL-MCNC: 10 MG/DL (ref 8–25)
CALCIUM SERPL-MCNC: 9.9 MG/DL (ref 8.5–10.4)
CHLORIDE SERPL-SCNC: 97 MMOL/L (ref 97–107)
CO2 SERPL-SCNC: 25 MMOL/L (ref 24–31)
COLOR UR: ABNORMAL
CREAT SERPL-MCNC: 1 MG/DL (ref 0.4–1.6)
EGFRCR SERPLBLD CKD-EPI 2021: 80 ML/MIN/1.73M*2
ERYTHROCYTE [DISTWIDTH] IN BLOOD BY AUTOMATED COUNT: 12.7 % (ref 11.5–14.5)
ETHANOL SERPL-MCNC: <0.01 G/DL
GLUCOSE SERPL-MCNC: 93 MG/DL (ref 65–99)
GLUCOSE UR STRIP.AUTO-MCNC: NORMAL MG/DL
HCT VFR BLD AUTO: 47.9 % (ref 41–52)
HGB BLD-MCNC: 16.7 G/DL (ref 13.5–17.5)
INR PPP: 1.1 (ref 0.9–1.2)
KETONES UR STRIP.AUTO-MCNC: ABNORMAL MG/DL
LACTATE BLDV-SCNC: 1.1 MMOL/L (ref 0.4–2)
LEUKOCYTE ESTERASE UR QL STRIP.AUTO: NEGATIVE
MCH RBC QN AUTO: 32.8 PG (ref 26–34)
MCHC RBC AUTO-ENTMCNC: 34.9 G/DL (ref 32–36)
MCV RBC AUTO: 94 FL (ref 80–100)
NITRITE UR QL STRIP.AUTO: NEGATIVE
NRBC BLD-RTO: 0 /100 WBCS (ref 0–0)
NT-PROBNP SERPL-MCNC: 86 PG/ML (ref 0–229)
PH UR STRIP.AUTO: 5 [PH]
PLATELET # BLD AUTO: 161 X10*3/UL (ref 150–450)
POTASSIUM SERPL-SCNC: 5.1 MMOL/L (ref 3.4–5.1)
PROT SERPL-MCNC: 7.1 G/DL (ref 5.9–7.9)
PROT UR STRIP.AUTO-MCNC: NEGATIVE MG/DL
PROTHROMBIN TIME: 11.3 SECONDS (ref 9.3–12.7)
RBC # BLD AUTO: 5.09 X10*6/UL (ref 4.5–5.9)
RBC # UR STRIP.AUTO: NEGATIVE /UL
SODIUM SERPL-SCNC: 134 MMOL/L (ref 133–145)
SP GR UR STRIP.AUTO: 1.01
TROPONIN T SERPL-MCNC: 12 NG/L
TROPONIN T SERPL-MCNC: 18 NG/L
UROBILINOGEN UR STRIP.AUTO-MCNC: NORMAL MG/DL
WBC # BLD AUTO: 5.9 X10*3/UL (ref 4.4–11.3)

## 2024-02-04 PROCEDURE — 84484 ASSAY OF TROPONIN QUANT: CPT | Performed by: EMERGENCY MEDICINE

## 2024-02-04 PROCEDURE — 83880 ASSAY OF NATRIURETIC PEPTIDE: CPT | Performed by: EMERGENCY MEDICINE

## 2024-02-04 PROCEDURE — 83605 ASSAY OF LACTIC ACID: CPT | Performed by: EMERGENCY MEDICINE

## 2024-02-04 PROCEDURE — 82077 ASSAY SPEC XCP UR&BREATH IA: CPT | Performed by: EMERGENCY MEDICINE

## 2024-02-04 PROCEDURE — 36415 COLL VENOUS BLD VENIPUNCTURE: CPT | Performed by: EMERGENCY MEDICINE

## 2024-02-04 PROCEDURE — 71045 X-RAY EXAM CHEST 1 VIEW: CPT

## 2024-02-04 PROCEDURE — 81003 URINALYSIS AUTO W/O SCOPE: CPT | Performed by: EMERGENCY MEDICINE

## 2024-02-04 PROCEDURE — 2500000001 HC RX 250 WO HCPCS SELF ADMINISTERED DRUGS (ALT 637 FOR MEDICARE OP): Performed by: EMERGENCY MEDICINE

## 2024-02-04 PROCEDURE — 80048 BASIC METABOLIC PNL TOTAL CA: CPT | Performed by: EMERGENCY MEDICINE

## 2024-02-04 PROCEDURE — 93005 ELECTROCARDIOGRAM TRACING: CPT

## 2024-02-04 PROCEDURE — 85610 PROTHROMBIN TIME: CPT | Performed by: EMERGENCY MEDICINE

## 2024-02-04 PROCEDURE — 99285 EMERGENCY DEPT VISIT HI MDM: CPT | Mod: 25 | Performed by: EMERGENCY MEDICINE

## 2024-02-04 PROCEDURE — 72125 CT NECK SPINE W/O DYE: CPT

## 2024-02-04 PROCEDURE — 85027 COMPLETE CBC AUTOMATED: CPT | Performed by: EMERGENCY MEDICINE

## 2024-02-04 PROCEDURE — 82248 BILIRUBIN DIRECT: CPT | Performed by: EMERGENCY MEDICINE

## 2024-02-04 PROCEDURE — 70450 CT HEAD/BRAIN W/O DYE: CPT

## 2024-02-04 RX ORDER — ASPIRIN 325 MG
325 TABLET ORAL ONCE
Status: DISCONTINUED | OUTPATIENT
Start: 2024-02-04 | End: 2024-02-04 | Stop reason: HOSPADM

## 2024-02-04 RX ORDER — LEVETIRACETAM 500 MG/1
500 TABLET ORAL 2 TIMES DAILY
Qty: 60 TABLET | Refills: 0 | Status: SHIPPED | OUTPATIENT
Start: 2024-02-04 | End: 2024-02-16 | Stop reason: WASHOUT

## 2024-02-04 RX ORDER — LEVETIRACETAM 500 MG/1
500 TABLET ORAL ONCE
Status: COMPLETED | OUTPATIENT
Start: 2024-02-04 | End: 2024-02-04

## 2024-02-04 RX ADMIN — LEVETIRACETAM 500 MG: 500 TABLET, FILM COATED ORAL at 15:25

## 2024-02-04 ASSESSMENT — COLUMBIA-SUICIDE SEVERITY RATING SCALE - C-SSRS
1. IN THE PAST MONTH, HAVE YOU WISHED YOU WERE DEAD OR WISHED YOU COULD GO TO SLEEP AND NOT WAKE UP?: NO
6. HAVE YOU EVER DONE ANYTHING, STARTED TO DO ANYTHING, OR PREPARED TO DO ANYTHING TO END YOUR LIFE?: NO
2. HAVE YOU ACTUALLY HAD ANY THOUGHTS OF KILLING YOURSELF?: NO

## 2024-02-04 ASSESSMENT — PAIN SCALES - GENERAL: PAINLEVEL_OUTOF10: 0 - NO PAIN

## 2024-02-04 ASSESSMENT — PAIN - FUNCTIONAL ASSESSMENT: PAIN_FUNCTIONAL_ASSESSMENT: 0-10

## 2024-02-04 NOTE — DISCHARGE INSTRUCTIONS
As discussed, follow-up with already scheduled appoint with neurologist but please take the Keppra as prescribed

## 2024-02-08 LAB
ATRIAL RATE: 92 BPM
P AXIS: 74 DEGREES
P OFFSET: 211 MS
P ONSET: 156 MS
PR INTERVAL: 132 MS
Q ONSET: 222 MS
QRS COUNT: 15 BEATS
QRS DURATION: 88 MS
QT INTERVAL: 344 MS
QTC CALCULATION(BAZETT): 425 MS
QTC FREDERICIA: 396 MS
R AXIS: 65 DEGREES
T AXIS: 69 DEGREES
T OFFSET: 394 MS
VENTRICULAR RATE: 92 BPM

## 2024-02-16 ENCOUNTER — OFFICE VISIT (OUTPATIENT)
Dept: NEUROLOGY | Facility: CLINIC | Age: 74
End: 2024-02-16
Payer: MEDICARE

## 2024-02-16 VITALS
HEART RATE: 90 BPM | BODY MASS INDEX: 20.02 KG/M2 | WEIGHT: 143 LBS | HEIGHT: 71 IN | DIASTOLIC BLOOD PRESSURE: 82 MMHG | SYSTOLIC BLOOD PRESSURE: 140 MMHG

## 2024-02-16 DIAGNOSIS — G40.209 PARTIAL EPILEPSY WITH IMPAIRMENT OF CONSCIOUSNESS (MULTI): Primary | ICD-10-CM

## 2024-02-16 DIAGNOSIS — F41.8 DEPRESSION WITH ANXIETY: ICD-10-CM

## 2024-02-16 PROCEDURE — 99214 OFFICE O/P EST MOD 30 MIN: CPT | Performed by: PSYCHIATRY & NEUROLOGY

## 2024-02-16 PROCEDURE — 1160F RVW MEDS BY RX/DR IN RCRD: CPT | Performed by: PSYCHIATRY & NEUROLOGY

## 2024-02-16 PROCEDURE — 1159F MED LIST DOCD IN RCRD: CPT | Performed by: PSYCHIATRY & NEUROLOGY

## 2024-02-16 PROCEDURE — 1126F AMNT PAIN NOTED NONE PRSNT: CPT | Performed by: PSYCHIATRY & NEUROLOGY

## 2024-02-16 RX ORDER — DIVALPROEX SODIUM 500 MG/1
1000 TABLET, FILM COATED, EXTENDED RELEASE ORAL DAILY
Qty: 180 TABLET | Refills: 3 | Status: SHIPPED | OUTPATIENT
Start: 2024-02-16 | End: 2025-02-15

## 2024-02-16 RX ORDER — LEVETIRACETAM 100 MG/ML
50 SOLUTION ORAL 2 TIMES DAILY
COMMUNITY
End: 2024-02-16 | Stop reason: WASHOUT

## 2024-02-16 ASSESSMENT — ENCOUNTER SYMPTOMS
GASTROINTESTINAL NEGATIVE: 1
DYSPHORIC MOOD: 1
CONSTITUTIONAL NEGATIVE: 1
RESPIRATORY NEGATIVE: 1
NEUROLOGICAL NEGATIVE: 1

## 2024-02-16 NOTE — PROGRESS NOTES
History Of Present Illness  Nicola Jackson is a 73 y.o. male with PMHx of THC use, alcohol use disorder and Left temporal lobe epilepsy  presenting for follow up seizures    Patient presented complaining of worsening working memory. States that he can remember old events, but has severe challenges remebering recemnt events. Patient presumes that his new memory challenges are related to the keppra use. He has stopped using the medication. His niece stated that he recently had a breakthrough seizure. Seizure was complex partial with secondary generalized, started as a lip smack and subsequently generalized. Event was about 5 minutes and resolved spontaneously. Patient is a chronic alcoholic and averages 5 cans to a case of beer every night. He states that he would not take the keppra anymore because of the side effects earlier on discussed and also associated depression and anxiety    Past Medical and Surgical History    Past Medical History:   Diagnosis Date    Alcohol abuse, uncomplicated 09/22/2015    Alcohol abuse    Alcohol dependence, in remission (CMS/Formerly [facility]) 02/25/2019    History of alcoholism    Cellulitis of right upper limb 04/16/2015    Cellulitis of arm, right    Colon cancer (CMS/Formerly [facility])     Dermatitis 04/26/2023    Effusion, unspecified elbow 06/01/2015    Elbow swelling    Localization-related (focal) (partial) symptomatic epilepsy and epileptic syndromes with simple partial seizures, not intractable, without status epilepticus (CMS/Formerly [facility]) 08/24/2022    Temporal lobe seizure    Other amnesia 09/22/2016    Poor memory    Other specified disorders of teeth and supporting structures 10/17/2016    Tooth pain    Other specified health status 11/09/2015    Statin intolerance    Other specified soft tissue disorders 04/19/2016    Finger swelling    Pain in unspecified finger(s) 04/06/2017    Finger pain    Pain in unspecified shoulder 04/06/2017    Shoulder pain    Personal history of other diseases of the  "digestive system 06/01/2015    History of gastritis    Personal history of other diseases of the musculoskeletal system and connective tissue 09/24/2019    History of neck pain    Personal history of other diseases of urinary system 05/21/2019    History of hematuria    Personal history of other malignant neoplasm of large intestine     History of malignant neoplasm of colon    Personal history of other specified conditions 06/18/2018    History of confusion    Personal history of other specified conditions 03/14/2019    History of chest pain    Personal history of other specified conditions 06/06/2019    History of abdominal pain    Recurrent UTI (urinary tract infection) 04/26/2023    Unspecified abdominal pain 02/02/2015    Abdominal pain of multiple sites    Unspecified cataract     Cataract, bilateral          Past Surgical History:   Procedure Laterality Date    CATARACT EXTRACTION  11/09/2015    Cataract Surgery    COLON SURGERY  08/22/2018    Colon Surgery    CT ABDOMEN ANGIOGRAM W AND/OR WO IV CONTRAST  7/4/2019    CT ABDOMEN ANGIOGRAM W AND/OR WO IV CONTRAST 7/4/2019 [facility]    MR HEAD ANGIO WO IV CONTRAST  9/17/2015    MR HEAD ANGIO WO IV CONTRAST 9/17/2015 [facility]    MR NECK ANGIO WO IV CONTRAST  9/17/2015    MR NECK ANGIO WO IV CONTRAST 9/17/2015 [facility]    OTHER SURGICAL HISTORY  08/04/2021    Finger surgical procedure        Social History  Social History     Tobacco Use    Smoking status: Every Day     Packs/day: 2     Types: Cigarettes    Smokeless tobacco: Never   Substance Use Topics    Alcohol use: Yes     Alcohol/week: 35.0 standard drinks of alcohol     Types: 35 Cans of beer per week    Drug use: Yes     Types: Marijuana     Comment: \"couple hits\"     Allergies  Losartan, Phenytoin, and Statins-hmg-coa reductase inhibitors  (Not in a hospital admission)      Review of Systems   Constitutional: Negative.    Respiratory: Negative.     Gastrointestinal: Negative.  " "  Genitourinary: Negative.    Neurological: Negative.    Psychiatric/Behavioral:  Positive for dysphoric mood.      Cardiovascular system: S1-S2 intact  Respiratory clear to auscultation bilateral on deep breathing he feels irritability on the left side of the chest.    Neurological Exam  Mental Status   Oriented to person, place, time and situation. Recent and remote memory are intact. Language is fluent with no aphasia. Attention and concentration are normal. Fund of knowledge is appropriate for level of education.    Cranial Nerves  CN III, IV, VI: Extraocular movements intact bilaterally.  CN V: Facial sensation is normal.  CN VII: Full and symmetric facial movement.  CN VIII: Hearing is normal.  CN IX, X: Palate elevates symmetrically  CN XI: Shoulder shrug strength is normal.  CN XII: Tongue midline without atrophy or fasciculations.    Motor  Normal muscle bulk throughout. Normal muscle tone.    Reflexes  Deep tendon reflexes are 2+ and symmetric in all four extremities.    Coordination    Finger-to-nose, rapid alternating movements and heel-to-shin normal bilaterally without dysmetria.    Gait  Casual gait is normal including stance, stride, and arm swing.    Last Recorded Vitals  Blood pressure 140/82, pulse 90, height 1.803 m (5' 11\"), weight 64.9 kg (143 lb).    Relevant Results      Current Outpatient Medications:     levETIRAcetam (Keppra) 50 mg/mL (Sugar-Free) suspension, Take 1 mL (50 mg) by mouth 2 times a day., Disp: , Rfl:     levETIRAcetam (Keppra) 1,000 mg tablet, Take 1 tablet (1,000 mg) by mouth 2 times a day for 60 doses. (Patient not taking: Reported on 2/4/2024), Disp: 60 tablet, Rfl: 0    levETIRAcetam (Keppra) 500 mg tablet, Take 1 tablet (500 mg) by mouth 2 times a day. (Patient not taking: Reported on 2/16/2024), Disp: 60 tablet, Rfl: 0     Continuous medications    PRN medications, reviewed    I have personally reviewed the following imaging for brain (CT/ MR) and results and discussed " in detail with the patient/care giver/family     ECG 12 Lead    Result Date: 2/8/2024  Normal sinus rhythm When compared with ECG of 25-NOV-2023 11:19, No significant change was found Confirmed by Bruno Otero ([id number]) on 2/8/2024 4:07:04 PM    CT cervical spine wo IV contrast    Result Date: 2/4/2024  Interpreted By:  John Rushing, STUDY: CT CERVICAL SPINE WO IV CONTRAST; 2/4/2024 12:56 pm   INDICATION: CLINICAL DATA: Signs/Symptoms:fall 1 week prior.   COMPARISON: 09/24/2023   ACCESSION NUMBER(S): [id number]   ORDERING CLINICIAN: MARIE CANTU   TECHNIQUE: A helical acquisition of data was obtained with multiplanar reconstructions performed.   One or more of the following dose reduction techniques were used: Automated exposure control Adjustment of the mA and/or kV according to patient size, and/or use of iterative reconstruction technique.   FINDINGS: No fractures or destructive lesions are identified. There is disc space narrowing with anterior and posterior marginal spurring at C6-7. There is facet spurring bilaterally at C2-3 and C3-4 emphysematous changes pulmonary at the apices bilaterally. Calcific plaque is present at the carotid bifurcations bilaterally.       No evidence for a fracture on this exam. Cervical spondylosis as described. Emphysematous change pulmonary parenchyma. New line atherosclerotic plaque carotid bifurcations bilaterally.   Signed by: John Rushing 2/4/2024 1:19 PM Dictation workstation:   [id number]    CT head wo IV contrast    Result Date: 2/4/2024  Interpreted By:  John Rushing, STUDY: CT HEAD WO IV CONTRAST; 2/4/2024 12:56 pm   INDICATION: Signs/Symptoms:Episodic aphasia.   COMPARISON: 11/25/2023   ACCESSION NUMBER(S): [id number]   ORDERING CLINICIAN: MARIE CANTU   TECHNIQUE: A helical acquisition data was obtained.   One or more of the following dose reduction techniques were used: Automated exposure control Adjustment of the mA and/or kV according to patient size, and/or  use of iterative reconstruction technique.   FINDINGS: Intracranial findings: There is no evidence for intracranial hemorrhage or mass effect. Age-related atrophy is present. Periventricular white matter hypodensity is present, most consistent with age-related change and/or white matter ischemic disease.   Paranasal sinuses and temporal bone findings: There are 2 7 mm soft tissue densities at the floor of the left maxillary sinus medially and a 4 mm density at the floor of the right maxillary sinus laterally and posteriorly, likely representing small cysts or polyps. There is mild mucosal thickening of the ethmoid air cells anteriorly. The remainder of the visualized portions of the paranasal sinuses, mastoid air cells, and middle ear cavities are clear.   Orbital findings: The visualized portions of the orbits are unremarkable.       No acute intracranial pathologic findings are identified. Probable bilateral cysts or polyps within the maxillary sinuses. Minimal mucosal thickening ethmoid air cells. There is no interval change when compared to the previous examination.   MACRO: none   Signed by: John Rushing 2/4/2024 1:12 PM Dictation workstation:   [id number]    XR chest 1 view    Result Date: 2/4/2024  Interpreted By:  Silas Frank, STUDY: XR CHEST 1 VIEW; 2/4/2024 12:37 pm   INDICATION: Signs/Symptoms:aphasia.   COMPARISON: 11/25/2023   ACCESSION NUMBER(S): [id number]   ORDERING CLINICIAN: MARIE CANTU   TECHNIQUE: 1 view of the chest was performed.   FINDINGS: The lungs are mildly hyperinflated. No acute consolidation. Minimal bibasilar airspace opacity likely subsegmental atelectasis and/or fibrosis. No pleural effusion. No pneumothorax.  The cardiomediastinal silhouette is within normal limits.       Hyperinflation. No acute cardiopulmonary disease.   Signed by: Silas Frank 2/4/2024 12:44 PM Dictation workstation:   [id number]       Imaging Brain/Head  MRN: [id number]  Patient Name: HILTON  RUFUS    STUDY:  MRI BRAIN W/WO CONTRAST; 8/25/2019 3:36 pm    INDICATION:  new multiple epsiodes seziure like activity.    COMPARISON:  08/24/2019 head CT and 09/17/2015 MR.    ACCESSION NUMBER(S):  [id number]    ORDERING CLINICIAN:  KASSIE CATHERINE    TECHNIQUE:  Axial T2, FLAIR, DWI, gradient echo T2 and sagittal and coronal T1  weighted images of brain were acquired. Post contrast T1 weighted  images were acquired after administration of 13 mL gadobenate  (Multihance) gadolinium based intravenous contrast.    FINDINGS:  CSF Spaces: The ventricles, sulci and basal cisterns are within  normal limits.    Parenchyma: There is no diffusion restriction abnormality to suggest  acute infarct.    The white matter has scattered focal FLAIR and T2 hyperintensity  similar to the prior study.    No mass, hemorrhage or abnormal enhancement of the brain is noted.    The cerebellar tonsils lie at the lower limits normal extending into  the foramen magnum with no compression of the cervicomedullary  junction.    Paranasal Sinuses and Mastoids: Mild mucosal thickening is noted  through the frontal and ethmoid air cells bilaterally with mild  mucosal thickening in the anterior right maxillary sinus.  No acute infarct, hemorrhage, mass or abnormal enhancement of the  brain is noted.    The white matter hyperintensities are nonspecific and not unusual for  the patient's age. These may be secondary to degenerative, chronic  microvascular ischemic, migraines or other etiologies. They are  similar to the prior study.      Imaging Cervical  PROCEDURE:         SPINE CERVICAL WO CONTRAST - [facility]  2008  REASON FOR EXAM: M54.2  NECK PAIN    RESULT: SPINE CERVICAL WO CONTRAST: 8/11/2021 10:19 AM    CLINICAL HISTORY: Neck pain. Bilateral upper extremity pain and numbness.    COMPARISON: None.    TECHNIQUE:  Multiecho and multiplanar imaging of the cervical spine was performed.    Findings: There is a minor remote compression deformity of the  superior  endplate of C7 with loss of approximately 10% of the vertebral body heights  centrally. There is also a remote compression deformity of the included T3  vertebral body, as seen on the sagittal sequences, with loss of  approximately 50% of the vertebral body height anteriorly. Incidental note  is made of minimal anterolisthesis of T1 on T2. There is mild bone marrow  edema at C6 and C7, likely degenerative/stress-related. The visualized  spinal cord appears normal signal and morphology. Negative for Chiari  malformation.    At C2-C3, no focal disc herniation or canal stenosis is identified. Mild  facet hypertrophic changes. There is mild/moderate narrowing of the left  neural foramen. The right neural foramen is patent.    At C3-C4, no focal disc herniation or canal stenosis identified. Facet  hypertrophic changes. Mild narrowing of bilateral neural foramina.    At C4-C5, no focal disc herniation is identified. Uncovertebral joint  disease and facet hypertrophic changes. Resultant mild canal stenosis.  Moderate to severe left and severe right foraminal narrowing.    At C5-6, no discrete focal disc herniation is identified. Uncovertebral  joint disease at facet hypertrophic changes. Resultant moderate to severe  canal stenosis. Severe narrowing of bilateral neural foramina.    At C6-C7, posterior disc osteophyte complex. No focal disc herniation  identified. Mild canal stenosis. Uncovertebral joint disease and facet  hypertrophic changes. Severe narrowing of bilateral neural foramina.    At C7-T1, no focal disc herniation or canal stenosis identified. Mild  narrowing of bilateral neural foramina patent.    Visualized upper thoracic spine, as seen on the sagittal sequences,  demonstrates moderate narrowing of bilateral neural foramina at T1-T2.    No discrete paraspinal soft tissue abnormality is identified.  1. Multilevel degenerative disc disease and spondylosis of the cervical  spine resulting in moderate  to severe canal stenosis at C5-C6.  2. Narrowing of neural foramina as described.        [id number]    This report has been produced using speech recognition.    Original Interpreting Physician:   JERONIMO KURTZ MD  Original Transcribed by/Date: PSCB   Aug 11 2021 10:22A  Original Electronically Signed by/Date: JERONIMO KURTZ MD Aug 11 2021  10:22A    Addendum Interpreting Physician:  Addendum Transcribed by/Date: NO ADDENDUM  Addendum Electronically Signed by/Date:    Imaging Thoracic  No results found for this or any previous visit.    Imaging Lumbar  PROCEDURE:         SPINE LUMBAR WO CONTRAST - [facility]  2010  REASON FOR EXAM: COLLAPSED VERT, NEC, LUMBAR REGION, SUBS FOR FX W ROUTN HEAL    RESULT: MRI OF THE LUMBAR SPINE:    TECHNIQUE: Multiplanar T1, T2 and STIR images were obtained.    CLINICAL HISTORY:  Worsening pain. Status post fall one year ago.  COMPARISON EXAMINATION:  1/28/2022    FINDINGS:  *  Conus medullaris is normal in appearance.  *  There is nearly complete flattening of the L4 vertebral body, unchanged  compared to the previous study. There is mild superior endplate depression  deformity of L1 and L2, unchanged compared to the prior study.    *  The thecal sac and intervertebral foramina are capacious from midportion  of T11 through the midportion of L1.    L1-L2:  *  There is minimal disc bulging at L1-2. Thecal sac density is minimal  encroachment posteriorly from facet arthropathy and minimal ligament flavum  thickening measuring approximately 3 mm. Intervertebral foramina are  capacious.    L2-L3:  *  There is minimal effacement thecal sac at L2-3 from minimal disc bulging.  There is minimal posterior encroachment from facet arthropathy and ligament  flavum thickening with the ligament flavum thickening measuring 3 mm.  Intervertebral foramina are capacious.    L3-L4:  *  There is a minimal, posterior superior corner of L4 encroachment upon the  anterior aspect of the thecal sac  along with a small 3 to 4 mm osteophyte  disc complex and association with the L4 endplate deformity from compression  fracture. This minimally efface the thecal sac anteriorly. Left L3-4 foramen  is remarkable for minimal encroachment from facet arthropathy and osteophyte  disc formation. Right L3-4 foramen is capacious. There is mild facet  arthropathy and ligament flavum thickening without thecal sac impingement at  this level.    L4-L5:  *  There is a mild broad-based protrusion at the L4-5 disc level minimally  patient thecal sac anteriorly. There is minimal posterior encroachment upon  the thecal sac from ligament flavum thickening bilaterally measuring  approximately 6.6 mm. There is mild bilateral foraminal stenosis from facet  arthropathy and osteophyte disc encroachment, slightly more marked on the  left.    L5-S1:  *  There is proximal to 4 mm disc herniation into the epidural fat at L5-S1  with no effacement of the thecal sac at this level. There is foraminal  stenosis bilaterally at L5-S1 from facet arthropathy, osteophyte disc  formation and loss of height of the disc space encroachment upon the  foramina bilaterally. There is mild impingement upon the L5 nerve roots  bilaterally, slightly more marked on the right.  1.  Diffuse lumbar spondylosis as detailed above with remote compression  fractures as described above. Please see the individual disc level report  above.  2.  No change compared to the previous study from 1/28/2022.    [id number]        This report has been produced using speech recognition.    Original Interpreting Physician:   JAZLYN RUIZ M.D.  Original Transcribed by/Date: [facility]   Apr 14 2022  2:30P  Original Electronically Signed by/Date: JAZLYN RUIZ M.D. Apr 14 2022  2:30P    Addendum Interpreting Physician:  Addendum Transcribed by/Date: NO ADDENDUM  Addendum Electronically Signed by/Date:      Assessment/Plan   #left temporal Lobe Epilepsy  - We will stop keppra on account  of non complaint and side effects    - we recommend reduction and ultimately discontinuation of both cigarette smoking and EtOH, patient declined any counseling and or referral for care  - Patient will benefit from psych evaluation for depression (trigger probably demise of his wife of 50 years)   - Patient will be started on controlled release depakote 1g daily  - Will refer to psychiatrist  - Patient is not clinically stable enough to drive    Patient/Family Education: Extensive time was spent educating the patient on relevant anatomy, clinical findings and imaging, as well as discussing the potential diagnoses as discussed above.  Pharmacology: as above. Exercise: I discussed the importance of maintaining a daily exercise program, including stretching and strengthening.      Sarahy Duran MD

## 2024-02-21 NOTE — PROGRESS NOTES
I saw and evaluated the patient. I personally obtained the key and critical portions of the history and physical exam or was physically present for key and critical portions performed by the resident/fellow. I reviewed the resident/fellow's documentation and discussed the patient with the resident/fellow. I agree with the resident/fellow's medical decision making as documented in the note.    Vinay Gonzalez MD

## 2024-03-11 ENCOUNTER — TELEPHONE (OUTPATIENT)
Dept: NEUROLOGY | Facility: CLINIC | Age: 74
End: 2024-03-11
Payer: MEDICARE

## 2024-03-11 ENCOUNTER — DOCUMENTATION (OUTPATIENT)
Dept: NEUROLOGY | Facility: CLINIC | Age: 74
End: 2024-03-11
Payer: MEDICARE
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
## 2024-07-28 ENCOUNTER — APPOINTMENT (OUTPATIENT)
Dept: RADIOLOGY | Facility: HOSPITAL | Age: 74
End: 2024-07-28
Payer: MEDICARE

## 2024-07-28 ENCOUNTER — HOSPITAL ENCOUNTER (EMERGENCY)
Facility: HOSPITAL | Age: 74
Discharge: HOME | End: 2024-07-28
Attending: STUDENT IN AN ORGANIZED HEALTH CARE EDUCATION/TRAINING PROGRAM
Payer: MEDICARE

## 2024-07-28 ENCOUNTER — APPOINTMENT (OUTPATIENT)
Dept: CARDIOLOGY | Facility: HOSPITAL | Age: 74
End: 2024-07-28
Payer: MEDICARE

## 2024-07-28 VITALS
WEIGHT: 137 LBS | SYSTOLIC BLOOD PRESSURE: 137 MMHG | TEMPERATURE: 98.2 F | DIASTOLIC BLOOD PRESSURE: 89 MMHG | HEIGHT: 70 IN | BODY MASS INDEX: 19.61 KG/M2 | OXYGEN SATURATION: 98 % | RESPIRATION RATE: 18 BRPM | HEART RATE: 96 BPM

## 2024-07-28 DIAGNOSIS — S20.212A CONTUSION OF RIB ON LEFT SIDE, INITIAL ENCOUNTER: Primary | ICD-10-CM

## 2024-07-28 DIAGNOSIS — R07.9 CHEST PAIN, UNSPECIFIED TYPE: ICD-10-CM

## 2024-07-28 LAB
ALBUMIN SERPL-MCNC: 4.3 G/DL (ref 3.5–5)
ALP BLD-CCNC: 124 U/L (ref 35–125)
ALT SERPL-CCNC: 22 U/L (ref 5–40)
ANION GAP SERPL CALC-SCNC: 8 MMOL/L
AST SERPL-CCNC: 28 U/L (ref 5–40)
BASOPHILS # BLD AUTO: 0.04 X10*3/UL (ref 0–0.1)
BASOPHILS NFR BLD AUTO: 0.6 %
BILIRUB SERPL-MCNC: 0.5 MG/DL (ref 0.1–1.2)
BUN SERPL-MCNC: 11 MG/DL (ref 8–25)
CALCIUM SERPL-MCNC: 9.6 MG/DL (ref 8.5–10.4)
CHLORIDE SERPL-SCNC: 94 MMOL/L (ref 97–107)
CO2 SERPL-SCNC: 28 MMOL/L (ref 24–31)
CREAT SERPL-MCNC: 0.9 MG/DL (ref 0.4–1.6)
EGFRCR SERPLBLD CKD-EPI 2021: >90 ML/MIN/1.73M*2
EOSINOPHIL # BLD AUTO: 0.1 X10*3/UL (ref 0–0.4)
EOSINOPHIL NFR BLD AUTO: 1.5 %
ERYTHROCYTE [DISTWIDTH] IN BLOOD BY AUTOMATED COUNT: 13 % (ref 11.5–14.5)
GLUCOSE SERPL-MCNC: 145 MG/DL (ref 65–99)
HCT VFR BLD AUTO: 43.6 % (ref 41–52)
HGB BLD-MCNC: 15.6 G/DL (ref 13.5–17.5)
IMM GRANULOCYTES # BLD AUTO: 0.02 X10*3/UL (ref 0–0.5)
IMM GRANULOCYTES NFR BLD AUTO: 0.3 % (ref 0–0.9)
LYMPHOCYTES # BLD AUTO: 1.65 X10*3/UL (ref 0.8–3)
LYMPHOCYTES NFR BLD AUTO: 24.4 %
MCH RBC QN AUTO: 32.5 PG (ref 26–34)
MCHC RBC AUTO-ENTMCNC: 35.8 G/DL (ref 32–36)
MCV RBC AUTO: 91 FL (ref 80–100)
MONOCYTES # BLD AUTO: 0.34 X10*3/UL (ref 0.05–0.8)
MONOCYTES NFR BLD AUTO: 5 %
NEUTROPHILS # BLD AUTO: 4.6 X10*3/UL (ref 1.6–5.5)
NEUTROPHILS NFR BLD AUTO: 68.2 %
NRBC BLD-RTO: 0 /100 WBCS (ref 0–0)
NT-PROBNP SERPL-MCNC: 141 PG/ML (ref 0–229)
PLATELET # BLD AUTO: 177 X10*3/UL (ref 150–450)
POTASSIUM SERPL-SCNC: 4.3 MMOL/L (ref 3.4–5.1)
PROT SERPL-MCNC: 6.6 G/DL (ref 5.9–7.9)
RBC # BLD AUTO: 4.8 X10*6/UL (ref 4.5–5.9)
SODIUM SERPL-SCNC: 130 MMOL/L (ref 133–145)
TROPONIN T SERPL-MCNC: 10 NG/L
TROPONIN T SERPL-MCNC: 9 NG/L
WBC # BLD AUTO: 6.8 X10*3/UL (ref 4.4–11.3)

## 2024-07-28 PROCEDURE — 71250 CT THORAX DX C-: CPT | Performed by: RADIOLOGY

## 2024-07-28 PROCEDURE — 80053 COMPREHEN METABOLIC PANEL: CPT | Performed by: STUDENT IN AN ORGANIZED HEALTH CARE EDUCATION/TRAINING PROGRAM

## 2024-07-28 PROCEDURE — 36415 COLL VENOUS BLD VENIPUNCTURE: CPT | Performed by: STUDENT IN AN ORGANIZED HEALTH CARE EDUCATION/TRAINING PROGRAM

## 2024-07-28 PROCEDURE — 85025 COMPLETE CBC W/AUTO DIFF WBC: CPT | Performed by: STUDENT IN AN ORGANIZED HEALTH CARE EDUCATION/TRAINING PROGRAM

## 2024-07-28 PROCEDURE — 93005 ELECTROCARDIOGRAM TRACING: CPT

## 2024-07-28 PROCEDURE — 83880 ASSAY OF NATRIURETIC PEPTIDE: CPT | Performed by: STUDENT IN AN ORGANIZED HEALTH CARE EDUCATION/TRAINING PROGRAM

## 2024-07-28 PROCEDURE — 71250 CT THORAX DX C-: CPT

## 2024-07-28 PROCEDURE — 71045 X-RAY EXAM CHEST 1 VIEW: CPT | Performed by: RADIOLOGY

## 2024-07-28 PROCEDURE — 71045 X-RAY EXAM CHEST 1 VIEW: CPT

## 2024-07-28 PROCEDURE — 99285 EMERGENCY DEPT VISIT HI MDM: CPT | Mod: 25

## 2024-07-28 PROCEDURE — 84484 ASSAY OF TROPONIN QUANT: CPT | Performed by: STUDENT IN AN ORGANIZED HEALTH CARE EDUCATION/TRAINING PROGRAM

## 2024-07-28 ASSESSMENT — COLUMBIA-SUICIDE SEVERITY RATING SCALE - C-SSRS
2. HAVE YOU ACTUALLY HAD ANY THOUGHTS OF KILLING YOURSELF?: NO
6. HAVE YOU EVER DONE ANYTHING, STARTED TO DO ANYTHING, OR PREPARED TO DO ANYTHING TO END YOUR LIFE?: NO
1. IN THE PAST MONTH, HAVE YOU WISHED YOU WERE DEAD OR WISHED YOU COULD GO TO SLEEP AND NOT WAKE UP?: NO

## 2024-07-28 ASSESSMENT — HEART SCORE
HEART SCORE: 3
ECG: NORMAL
AGE: 65+
TROPONIN: LESS THAN OR EQUAL TO NORMAL LIMIT
RISK FACTORS: 1-2 RISK FACTORS
HISTORY: SLIGHTLY SUSPICIOUS

## 2024-07-28 ASSESSMENT — PAIN - FUNCTIONAL ASSESSMENT: PAIN_FUNCTIONAL_ASSESSMENT: 0-10

## 2024-07-28 ASSESSMENT — PAIN SCALES - GENERAL
PAINLEVEL_OUTOF10: 0 - NO PAIN
PAINLEVEL_OUTOF10: 0 - NO PAIN

## 2024-07-28 NOTE — ED PROVIDER NOTES
HPI   Chief Complaint   Patient presents with    Chest Pain     Chest pressure since last evening, neck and jaw pain, hx of recent falls, states cannot hear well       Patient is a 73-year-old male with a history of alcohol abuse that presents emergency room for evaluation of left-sided chest pain.  Patient has been complaining of chest pain on the left side for the last several days.  He reports that he believes it is due to frequent falls he has been having at home.  He states the falls are due to drinking too much alcohol as well as smoking marijuana.  He describes as an aching pain that comes and goes in the left side of the chest and believes he may have bruised his ribs.  He denies shortness of breath, abdominal pain, nausea, vomiting, fevers or chills.  He denies any prior cardiac history. Family was concerned due to patient's pain and brought him in for further cardiac evaluation.      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, not intractable, without status epilepticus (Multi) 08/24/2022    Temporal lobe seizure    Other amnesia 09/22/2016    Poor memory    Other specified disorders of teeth and supporting structures 10/17/2016    Tooth pain    Other specified health status 11/09/2015    Statin intolerance    Other specified soft tissue disorders 04/19/2016    Finger swelling    Pain in unspecified finger(s) 04/06/2017    Finger pain    Pain in unspecified shoulder 04/06/2017    Shoulder pain    Personal history of other  "diseases of the digestive system 06/01/2015    History of gastritis    Personal history of other diseases of the musculoskeletal system and connective tissue 09/24/2019    History of neck pain    Personal history of other diseases of urinary system 05/21/2019    History of hematuria    Personal history of other malignant neoplasm of large intestine     History of malignant neoplasm of colon    Personal history of other specified conditions 06/18/2018    History of confusion    Personal history of other specified conditions 03/14/2019    History of chest pain    Personal history of other specified conditions 06/06/2019    History of abdominal pain    Recurrent UTI (urinary tract infection) 04/26/2023    Unspecified abdominal pain 02/02/2015    Abdominal pain of multiple sites    Unspecified cataract     Cataract, bilateral     Past Surgical History:   Procedure Laterality Date    CATARACT EXTRACTION  11/09/2015    Cataract Surgery    COLON SURGERY  08/22/2018    Colon Surgery    CT ABDOMEN ANGIOGRAM W AND/OR WO IV CONTRAST  7/4/2019    CT ABDOMEN ANGIOGRAM W AND/OR WO IV CONTRAST 7/4/2019 [facility]    MR HEAD ANGIO WO IV CONTRAST  9/17/2015    MR HEAD ANGIO WO IV CONTRAST 9/17/2015 [facility]    MR NECK ANGIO WO IV CONTRAST  9/17/2015    MR NECK ANGIO WO IV CONTRAST 9/17/2015 [facility]    OTHER SURGICAL HISTORY  08/04/2021    Finger surgical procedure     No family history on file.  Social History     Tobacco Use    Smoking status: Every Day     Current packs/day: 2.00     Types: Cigarettes    Smokeless tobacco: Never   Substance Use Topics    Alcohol use: Yes     Alcohol/week: 35.0 standard drinks of alcohol     Types: 35 Cans of beer per week    Drug use: Yes     Types: Marijuana     Comment: \"couple hits\"       Physical Exam   ED Triage Vitals [07/28/24 1133]   Temperature Heart Rate Respirations BP   36.6 °C (97.9 °F) 96 18 137/89      Pulse Ox Temp Source Heart Rate Source Patient Position "   98 % Oral Monitor Sitting      BP Location FiO2 (%)     Right arm --       Physical Exam  Vitals and nursing note reviewed.   Constitutional:       General: He is not in acute distress.     Appearance: He is not ill-appearing or diaphoretic.   HENT:      Head: Normocephalic and atraumatic.   Eyes:      Extraocular Movements: Extraocular movements intact.      Pupils: Pupils are equal, round, and reactive to light.      Comments: No foreign body seen in the eyes, no conjunctival injection   Neck:      Vascular: No JVD.   Cardiovascular:      Rate and Rhythm: Normal rate and regular rhythm.      Pulses:           Radial pulses are 2+ on the right side and 2+ on the left side.      Heart sounds: Normal heart sounds.   Pulmonary:      Breath sounds: No decreased breath sounds, wheezing, rhonchi or rales.      Comments: Mild left-sided chest wall tenderness palpation without obvious deformity  Musculoskeletal:      Cervical back: Normal range of motion.      Right lower leg: No edema.      Left lower leg: No edema.   Skin:     General: Skin is warm and dry.   Neurological:      General: No focal deficit present.      Mental Status: He is alert.       Recent Results (from the past 24 hour(s))   CBC and Auto Differential    Collection Time: 07/28/24 11:36 AM   Result Value Ref Range    WBC 6.8 4.4 - 11.3 x10*3/uL    nRBC 0.0 0.0 - 0.0 /100 WBCs    RBC 4.80 4.50 - 5.90 x10*6/uL    Hemoglobin 15.6 13.5 - 17.5 g/dL    Hematocrit 43.6 41.0 - 52.0 %    MCV 91 80 - 100 fL    MCH 32.5 26.0 - 34.0 pg    MCHC 35.8 32.0 - 36.0 g/dL    RDW 13.0 11.5 - 14.5 %    Platelets 177 150 - 450 x10*3/uL    Neutrophils % 68.2 40.0 - 80.0 %    Immature Granulocytes %, Automated 0.3 0.0 - 0.9 %    Lymphocytes % 24.4 13.0 - 44.0 %    Monocytes % 5.0 2.0 - 10.0 %    Eosinophils % 1.5 0.0 - 6.0 %    Basophils % 0.6 0.0 - 2.0 %    Neutrophils Absolute 4.60 1.60 - 5.50 x10*3/uL    Immature Granulocytes Absolute, Automated 0.02 0.00 - 0.50 x10*3/uL     Lymphocytes Absolute 1.65 0.80 - 3.00 x10*3/uL    Monocytes Absolute 0.34 0.05 - 0.80 x10*3/uL    Eosinophils Absolute 0.10 0.00 - 0.40 x10*3/uL    Basophils Absolute 0.04 0.00 - 0.10 x10*3/uL   Comprehensive metabolic panel    Collection Time: 07/28/24 11:36 AM   Result Value Ref Range    Glucose 145 (H) 65 - 99 mg/dL    Sodium 130 (L) 133 - 145 mmol/L    Potassium 4.3 3.4 - 5.1 mmol/L    Chloride 94 (L) 97 - 107 mmol/L    Bicarbonate 28 24 - 31 mmol/L    Urea Nitrogen 11 8 - 25 mg/dL    Creatinine 0.90 0.40 - 1.60 mg/dL    eGFR >90 >60 mL/min/1.73m*2    Calcium 9.6 8.5 - 10.4 mg/dL    Albumin 4.3 3.5 - 5.0 g/dL    Alkaline Phosphatase 124 35 - 125 U/L    Total Protein 6.6 5.9 - 7.9 g/dL    AST 28 5 - 40 U/L    Bilirubin, Total 0.5 0.1 - 1.2 mg/dL    ALT 22 5 - 40 U/L    Anion Gap 8 <=19 mmol/L   NT Pro-BNP    Collection Time: 07/28/24 11:36 AM   Result Value Ref Range    PROBNP 141 0 - 229 pg/mL   Serial Troponin, Initial ([facility])    Collection Time: 07/28/24 11:36 AM   Result Value Ref Range    Troponin T, High Sensitivity 9 <=14 ng/L   Serial Troponin, 2 Hour ([facility])    Collection Time: 07/28/24  1:25 PM   Result Value Ref Range    Troponin T, High Sensitivity 10 <=14 ng/L           ED Course & MDM   ED Course as of 07/28/24 1407   Sun Jul 28, 2024   1135 EKG Time:1131  EKG Interpretation time:1135  EKG Interpretation: EKG shows normal sinus rhythm with a rate of 90 bpm, normal axis, QTc 413, no evidence of STEMI.    EKG was interpreted by myself independently [JL]      ED Course User Index  [JL] Jonnathan Wilson DO         Diagnoses as of 07/28/24 1407   Contusion of rib on left side, initial encounter   Chest pain, unspecified type                       [place] Coma Scale Score: 15                        Medical Decision Making  Patient is a 73-year-old male who presents emergency department for evaluation of chest pain.  Patient uncomfortable appearing but in no obvious stress on exam.  He is awake, alert and  oriented.  EKG shows normal sinus rhythm with no evidence of STEMI or active ischemia.  Chest x-ray is clear showing no evidence of pneumonia, pneumothorax, widening sign.  Blood work ordered including CBC, CMP, troponin, BNP which were unremarkable including normal white count of 6.8 with minimally decreased sodium of 130 which is likely related to patient's alcohol use.  Remainder of electrolytes are normal at this time.  He does have normal troponin of 9 which remained flat on repeat testing at 10.  Chest x-ray radiologist did recommend patient have further CT imaging and given his reported falls with chest pain CT scan of chest without contrast was ordered which showed no evidence of rib fractures, pneumothorax or pneumonia.  Patient likely has rib contusions due to his fall given the tenderness palpation of the left side of the chest.  I have very low suspicion for cardiac source of patient's pain as he is considered very low risk for major cardiac event.  I did discuss this with patient and he is agreeable for outpatient follow-up with his primary care physician at this time.  I did discuss with him the importance of trying to cut back on alcohol as this is likely contributing to his frequent falls at home and patient does voiced understanding and agrees that this is what is causing it.  He does note that he has been trying to quit at home however adamantly declines wanting any further help at this time with his detox.  Patient felt to be stable for discharge home at this time        Procedure  Procedures     Jonnathan Wilson,   07/28/24 1936

## 2024-07-28 NOTE — DISCHARGE INSTRUCTIONS
Follow-up with your primary care physician.  I do strongly recommend trying to decrease your alcohol intake as this is likely contributing to your falls.  If you change your mind and want help with alcohol detox you can return at any time for further evaluation and treatment.  Try to rest and you can take ibuprofen and Tylenol as needed for pain due to your rib contusions.

## 2024-08-02 LAB
ATRIAL RATE: 90 BPM
P AXIS: 78 DEGREES
P OFFSET: 201 MS
P ONSET: 154 MS
PR INTERVAL: 134 MS
Q ONSET: 221 MS
QRS COUNT: 15 BEATS
QRS DURATION: 80 MS
QT INTERVAL: 338 MS
QTC CALCULATION(BAZETT): 413 MS
QTC FREDERICIA: 387 MS
R AXIS: 65 DEGREES
T AXIS: 69 DEGREES
T OFFSET: 390 MS
VENTRICULAR RATE: 90 BPM

## 2024-09-27 ENCOUNTER — APPOINTMENT (OUTPATIENT)
Dept: PRIMARY CARE | Facility: CLINIC | Age: 74
End: 2024-09-27
Payer: MEDICARE

## 2024-10-04 ENCOUNTER — APPOINTMENT (OUTPATIENT)
Dept: PRIMARY CARE | Facility: CLINIC | Age: 74
End: 2024-10-04
Payer: MEDICARE

## 2025-02-17 ENCOUNTER — APPOINTMENT (OUTPATIENT)
Dept: NEUROLOGY | Facility: CLINIC | Age: 75
End: 2025-02-17
Payer: COMMERCIAL